# Patient Record
Sex: FEMALE | Race: WHITE | Employment: STUDENT | URBAN - METROPOLITAN AREA
[De-identification: names, ages, dates, MRNs, and addresses within clinical notes are randomized per-mention and may not be internally consistent; named-entity substitution may affect disease eponyms.]

---
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
## 2023-05-01 ENCOUNTER — LAB (OUTPATIENT)
Dept: LAB | Facility: LAB | Age: 23
End: 2023-05-01
Payer: COMMERCIAL

## 2023-05-01 ENCOUNTER — APPOINTMENT (OUTPATIENT)
Dept: PRIMARY CARE | Facility: CLINIC | Age: 23
End: 2023-05-01
Payer: COMMERCIAL

## 2023-05-01 DIAGNOSIS — Z00.00 ROUTINE GENERAL MEDICAL EXAMINATION AT A HEALTH CARE FACILITY: ICD-10-CM

## 2023-05-01 PROCEDURE — 36415 COLL VENOUS BLD VENIPUNCTURE: CPT

## 2023-05-01 PROCEDURE — 86481 TB AG RESPONSE T-CELL SUSP: CPT

## 2023-05-04 LAB
NIL(NEG) CONTROL SPOT COUNT: NORMAL
PANEL A SPOT COUNT: 0
PANEL B SPOT COUNT: 0
POS CONTROL SPOT COUNT: NORMAL
T-SPOT. TB INTERPRETATION: NEGATIVE

## 2023-05-24 PROBLEM — R12 HEARTBURN: Status: ACTIVE | Noted: 2021-05-21

## 2023-05-24 PROBLEM — F43.9 STRESS: Status: ACTIVE | Noted: 2023-05-24

## 2023-05-24 PROBLEM — N94.6 DYSMENORRHEA: Status: ACTIVE | Noted: 2021-05-21

## 2023-05-24 PROBLEM — F41.9 ANXIETY: Status: ACTIVE | Noted: 2021-05-21

## 2023-05-24 PROBLEM — N92.0 HEAVY MENSES: Status: ACTIVE | Noted: 2023-05-24

## 2023-05-24 PROBLEM — F32.A DEPRESSIVE DISORDER: Status: ACTIVE | Noted: 2021-05-21

## 2023-05-25 ENCOUNTER — OFFICE VISIT (OUTPATIENT)
Dept: PRIMARY CARE | Facility: CLINIC | Age: 23
End: 2023-05-25
Payer: COMMERCIAL

## 2023-05-25 VITALS
TEMPERATURE: 97.6 F | RESPIRATION RATE: 16 BRPM | HEART RATE: 81 BPM | WEIGHT: 136 LBS | BODY MASS INDEX: 26.7 KG/M2 | DIASTOLIC BLOOD PRESSURE: 64 MMHG | HEIGHT: 60 IN | SYSTOLIC BLOOD PRESSURE: 94 MMHG | OXYGEN SATURATION: 100 %

## 2023-05-25 DIAGNOSIS — S41.111A LACERATION OF RIGHT UPPER EXTREMITY, INITIAL ENCOUNTER: Primary | ICD-10-CM
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
## 2023-05-25 PROCEDURE — 99213 OFFICE O/P EST LOW 20 MIN: CPT | Performed by: FAMILY MEDICINE

## 2023-05-25 PROCEDURE — 1036F TOBACCO NON-USER: CPT | Performed by: FAMILY MEDICINE

## 2023-05-25 RX ORDER — CEPHALEXIN 500 MG/1
500 CAPSULE ORAL 2 TIMES DAILY
Qty: 14 CAPSULE | Refills: 0 | Status: SHIPPED | OUTPATIENT
Start: 2023-05-25 | End: 2023-06-01

## 2023-05-25 RX ORDER — CITALOPRAM 10 MG/1
10 TABLET ORAL DAILY
COMMUNITY

## 2023-05-25 ASSESSMENT — PATIENT HEALTH QUESTIONNAIRE - PHQ9
1. LITTLE INTEREST OR PLEASURE IN DOING THINGS: NOT AT ALL
2. FEELING DOWN, DEPRESSED OR HOPELESS: NOT AT ALL
SUM OF ALL RESPONSES TO PHQ9 QUESTIONS 1 AND 2: 0

## 2023-05-25 NOTE — PROGRESS NOTES
Subjective   Patient ID: Bhakti Higuera is a 22 y.o. female who presents for Cut on arm.  HPI  Pt is here for cut on the left arm possible infection x 4 days  Pt arm is very red scratch jules on her left forearm  Pt was scratch by her cat on Sunday  Pt is using Neosporin on her wound      No other concern      Review of systems  ; Patient seen today for exam denies any problems with headaches or vision, denies any shortness of breath chest pain nausea or vomiting, no black stool no blood in the stool no heartburn type symptoms denies any problems with constipation or diarrhea, and no dysuria-type symptoms    The patient's allergies medications were reviewed with them today    The patient's social family and surgical history or also reviewed here today, along with her past medical history.     Objective     Alert and active in  no acute distress    Neurological exam unremarkable- DTRs in upper and lower extremities within normal limits.   skin -no lesions noted left arm with several abrasions 1 a little deeper with some erythema surrounding the wound no signs of secondary infection at this time but some early signs of irritation      BP 94/64 (BP Location: Left arm, Patient Position: Sitting, BP Cuff Size: Adult)   Pulse 81   Temp 36.4 °C (97.6 °F) (Temporal)   Resp 16   Ht 1.524 m (5')   Wt 61.7 kg (136 lb)   SpO2 100%   BMI 26.56 kg/m²     No Known Allergies    Assessment/Plan   Problem List Items Addressed This Visit    None  Visit Diagnoses       Laceration of right upper extremity, initial encounter    -  Primary    Cat scratch        Relevant Medications    cephalexin (Keflex) 500 mg capsule        We will keep this area clean and dry we will use the Keflex for the next week we will let us know if things worsen or change tetanus is up-to-date      If anything worsens or changes please call us at once, follow up in the office as planned,

## 2023-12-18 ENCOUNTER — OFFICE VISIT (OUTPATIENT)
Dept: PRIMARY CARE | Facility: CLINIC | Age: 23
End: 2023-12-18
Payer: COMMERCIAL

## 2023-12-18 VITALS
WEIGHT: 139 LBS | DIASTOLIC BLOOD PRESSURE: 62 MMHG | OXYGEN SATURATION: 100 % | SYSTOLIC BLOOD PRESSURE: 104 MMHG | BODY MASS INDEX: 27.29 KG/M2 | HEART RATE: 79 BPM | RESPIRATION RATE: 16 BRPM | HEIGHT: 60 IN | TEMPERATURE: 98.5 F

## 2023-12-18 DIAGNOSIS — R21 RASH: ICD-10-CM

## 2023-12-18 DIAGNOSIS — B07.9 VIRAL WARTS, UNSPECIFIED TYPE: ICD-10-CM

## 2023-12-18 PROCEDURE — 3008F BODY MASS INDEX DOCD: CPT | Performed by: FAMILY MEDICINE

## 2023-12-18 PROCEDURE — 99213 OFFICE O/P EST LOW 20 MIN: CPT | Performed by: FAMILY MEDICINE

## 2023-12-18 PROCEDURE — 1036F TOBACCO NON-USER: CPT | Performed by: FAMILY MEDICINE

## 2023-12-18 RX ORDER — RELUGOLIX, ESTRADIOL HEMIHYDRATE, AND NORETHINDRONE ACETATE 40; 1; .5 MG/1; MG/1; MG/1
1 TABLET, FILM COATED ORAL
COMMUNITY
Start: 2023-06-30

## 2023-12-18 NOTE — PROGRESS NOTES
Subjective   Patient ID: Bhakti Higuera is a 23 y.o. female who presents for Rash.    HPI    Pt is being seen today for rash on Toe    Ongoing for 3 week ago  The rash or bump are located in between 2 toes   Other symptoms include itching, redness, some sensitive when wear shoe  Pt is not using or taking any OTC medication for her toes  Denies a rash anywhere else.   Shares a shower with roommate.     Flu 10/6/23     No other concern or question    Review of systems  ; Patient seen today for exam denies any problems with headaches or vision, denies any shortness of breath chest pain nausea or vomiting, no black stool no blood in the stool no heartburn type symptoms denies any problems with constipation or diarrhea, and no dysuria-type symptoms    The patient's allergies medications were reviewed with them today    The patient's social family and surgical history or also reviewed here today, along with her past medical history.     Objective     Alert and active in  no acute distress    Neurological exam unremarkable- extremities within normal limits.   skin -positive for okay on her toes second and third frozen with acute nitrogen refreeze technique x 2      /62 (BP Location: Left arm, Patient Position: Sitting, BP Cuff Size: Adult)   Pulse 79   Temp 36.9 °C (98.5 °F) (Temporal)   Resp 16   Ht 1.524 m (5')   Wt 63 kg (139 lb)   SpO2 100%   BMI 27.15 kg/m²     No Known Allergies    Assessment/Plan   Problem List Items Addressed This Visit       BMI 27.0-27.9,adult    Rash     Other Visit Diagnoses       Viral warts, unspecified type              Wart of right foot removed via freeze technique.     Recommend wearing flip flops in the shower.     If anything worsens or changes please call us at once, follow up in the office as planned.    Scribe Attestation  By signing my name below, I, Monica Wolf MA, Scribe   attest that this documentation has been prepared under the direction and in the  presence of Jose Hodges DO.

## 2024-05-23 NOTE — PROGRESS NOTES
Subjective   Patient ID: Bhakti Higuera is a 23 y.o. female who presents for No chief complaint on file..    HPI    Patient presents today for bloody stools. Ongoing x ***. Patient states ***. Has tried ***.    Taking current medications which were reviewed.  Problem list discussed.    Overall doing well.  Eating okay.  Staying active.    Has no other new problem /question.      ROS  Constitutional- No activity change. No appetite change.  Eyes- Denies vision changes.  Respiratory- No shortness of breath.  Cardiovascular- No palpitations. No chest pain.  GI- No nausea or vomiting. No diarrhea or constipation. Denies abdominal pain.  Musculoskeletal- Denies joint swelling.  Extremities- No edema.  Neurological- Denies headaches. Denies dizziness.  Skin- No rashes.  Psychiatric/Behavioral- Denies significant anxiety, or depressed mood.     Objective     There were no vitals taken for this visit.    No Known Allergies    Constitutional-- Well-nourished.  No distress  Head- unremarkable.  Ears- TMs clear.  Eyes- PERRL.  Conjunctiva normal.  Nose- Normal.  No rhinorrhea noted.  Throat- Oropharynx is clear and moist.  Neck- Supple with no thyromegaly.  No significant cervical adenopathy noted.  Pulmonary/Chest- Breath sounds normal with normal effort.  No wheezing.  Heart- Regular rate and rhythm.  No murmur.  Abdomen- Soft and non-tender.  No masses noted.  Musculoskeletal- Normal ROM.  No significant joint swelling  Extremities- No edema.   Neurological- Alert.  No noted deficits.  Skin- Warm.  No rashes.  Psychiatric/Behavioral- Mood and affect normal.  Behavior normal.     Assessment/Plan   No diagnosis found.       Long talk. Treatment options reviewed.    Continue and take your medications as prescribed.    Health Maintenance issues discussed.    Importance of healthy diet and regular exercise regimen discussed.    We will contact you with any test results ordered. If you do not hear from us, please  contact.    Follow-up as instructed or sooner if any problems or symptoms do not resolve as expected.

## 2024-05-29 ENCOUNTER — APPOINTMENT (OUTPATIENT)
Dept: PRIMARY CARE | Facility: CLINIC | Age: 24
End: 2024-05-29
Payer: COMMERCIAL

## 2024-06-10 ENCOUNTER — LAB (OUTPATIENT)
Dept: LAB | Facility: LAB | Age: 24
End: 2024-06-10
Payer: COMMERCIAL

## 2024-06-10 ENCOUNTER — OFFICE VISIT (OUTPATIENT)
Dept: PRIMARY CARE | Facility: CLINIC | Age: 24
End: 2024-06-10
Payer: COMMERCIAL

## 2024-06-10 VITALS
SYSTOLIC BLOOD PRESSURE: 120 MMHG | BODY MASS INDEX: 29.64 KG/M2 | OXYGEN SATURATION: 99 % | HEIGHT: 59 IN | RESPIRATION RATE: 18 BRPM | DIASTOLIC BLOOD PRESSURE: 60 MMHG | HEART RATE: 70 BPM | WEIGHT: 147 LBS

## 2024-06-10 DIAGNOSIS — Z01.84 IMMUNITY STATUS TESTING: ICD-10-CM

## 2024-06-10 DIAGNOSIS — Z00.00 HEALTH CARE MAINTENANCE: ICD-10-CM

## 2024-06-10 DIAGNOSIS — K92.1 BLOOD IN STOOL: ICD-10-CM

## 2024-06-10 DIAGNOSIS — Z00.00 HEALTH CARE MAINTENANCE: Primary | ICD-10-CM

## 2024-06-10 PROCEDURE — 86317 IMMUNOASSAY INFECTIOUS AGENT: CPT

## 2024-06-10 PROCEDURE — 3008F BODY MASS INDEX DOCD: CPT | Performed by: FAMILY MEDICINE

## 2024-06-10 PROCEDURE — 86787 VARICELLA-ZOSTER ANTIBODY: CPT

## 2024-06-10 PROCEDURE — 86481 TB AG RESPONSE T-CELL SUSP: CPT

## 2024-06-10 PROCEDURE — 86765 RUBEOLA ANTIBODY: CPT

## 2024-06-10 PROCEDURE — 86706 HEP B SURFACE ANTIBODY: CPT

## 2024-06-10 PROCEDURE — 36415 COLL VENOUS BLD VENIPUNCTURE: CPT

## 2024-06-10 PROCEDURE — 86735 MUMPS ANTIBODY: CPT

## 2024-06-10 PROCEDURE — 99395 PREV VISIT EST AGE 18-39: CPT | Performed by: FAMILY MEDICINE

## 2024-06-10 PROCEDURE — 1036F TOBACCO NON-USER: CPT | Performed by: FAMILY MEDICINE

## 2024-06-10 NOTE — PROGRESS NOTES
"Subjective   Patient ID: Bhakti Higuera is a 23 y.o. female who presents for Rectal Bleeding.  HPI    Patient presents in office today for bloody stool. This happened a few weeks ago. Patient admits that nothing has happened since then.  It was a small of blood in the toilet.  Once or twice on the toilet paper.  Denies anything sticking out of the rectum.  Denies any rectal pain or itching.  Denies any bloody diarrhea.    Patient admits that she will be going to PT school in the fall. Patient admits that she will need Tdap vaccine and TB testing done.  Also needs to have titers drawn.    Taking current medications which were reviewed.  Problem list discussed.    Overall doing well.  Eating okay.  Staying active.    Has no other new problem /question.     ROS  Constitutional- No activity change. No appetite change.  Eyes- Denies vision changes.  Respiratory- No shortness of breath.  Cardiovascular- No palpitations. No chest pain.  GI- No nausea or vomiting. No diarrhea or constipation. Denies abdominal pain.  Musculoskeletal- Denies joint swelling.  Extremities- No edema.  Neurological- Denies headaches. Denies dizziness.  Skin- No rashes.  Psychiatric/Behavioral- Denies significant anxiety, or depressed mood.     Objective     /60   Pulse 70   Resp 18   Ht 1.499 m (4' 11\")   Wt 66.7 kg (147 lb)   SpO2 99%   BMI 29.69 kg/m²     No Known Allergies    Constitutional-- Well-nourished.  No distress  Head- unremarkable.  Eyes- PERRL.  Conjunctiva normal.  Nose- Normal.  No rhinorrhea noted.  Throat- Oropharynx is clear and moist.  Neck- Supple with no thyromegaly.  No significant cervical adenopathy noted.  Pulmonary/Chest- Breath sounds normal with normal effort.  No wheezing.  Heart- Regular rate and rhythm.  No murmur.  Abdomen- Soft and non-tender.  No masses noted.  Musculoskeletal- Normal ROM.  No significant joint swelling  Extremities- No edema.   Neurological- Alert.  No noted deficits.  Skin- " Warm.  No rashes.  Psychiatric/Behavioral- Mood and affect normal.  Behavior normal.     Assessment/Plan   1. Health care maintenance  T-Spot TB    Hepatitis B surface antibody    Varicella zoster antibody, IgG    Measles (Rubeola) Antibody, IgG    Measles (Rubeola) Antibody, IgG    Mumps Antibody, IgG      2. Blood in stool        3. Immunity status testing  T-Spot TB    Hepatitis B surface antibody    Varicella zoster antibody, IgG    Measles (Rubeola) Antibody, IgG    Measles (Rubeola) Antibody, IgG    Mumps Antibody, IgG             Long talk. Treatment options reviewed.  Given the fact this only happened slightly once and a few times minimally she wishes to hold off any further workup and treatment and I agree.  Talked about the importance of being well-hydrated and making sure her stools stay soft    Reviewed most recent lab work with patient. Advised patient to remain up to date on routine maintenance and health screening.  Advised patient to remain up to date on immunizations.      Complete lab work as discussed.  Advised patient to remain properly hydrated.      Continue and take your medications as prescribed.    Health Maintenance issues discussed.    Importance of healthy diet and regular exercise regimen discussed.    We will contact you with any test results ordered. If you do not hear from us, please contact.    Follow-up as instructed or sooner if any problems or symptoms do not resolve as expected.          Scribe Attestation  By signing my name below, I, Jacobo Nair   attest that this documentation has been prepared under the direction and in the presence of Fredo Bustos MD.

## 2024-06-11 LAB
HBV SURFACE AB SER-ACNC: <3.1 MIU/ML
MEV IGG SER QL IA: POSITIVE
MUMPS IGG ANTIBODY INDEX: 1.9 IA
MUV IGG SER IA-ACNC: POSITIVE
RUBEOLA IGG ANTIBODY INDEX: 2.3 IA
VARICELLA ZOSTER IGG INDEX: 1 IA
VZV IGG SER QL IA: ABNORMAL

## 2024-06-12 LAB
NIL(NEG) CONTROL SPOT COUNT: NORMAL
PANEL A SPOT COUNT: 2
PANEL B SPOT COUNT: 2
POS CONTROL SPOT COUNT: NORMAL
T-SPOT. TB INTERPRETATION: NEGATIVE

## 2024-06-13 ENCOUNTER — TELEPHONE (OUTPATIENT)
Dept: PRIMARY CARE | Facility: CLINIC | Age: 24
End: 2024-06-13
Payer: COMMERCIAL

## 2024-06-13 LAB
RUBV IGG SERPL IA-ACNC: 2.3 IA
RUBV IGG SERPL QL IA: POSITIVE

## 2024-06-13 NOTE — TELEPHONE ENCOUNTER
----- Message from Fredo Bustos MD sent at 6/13/2024  9:24 AM EDT -----  Please let her know that her test results came back okay except her hepatitis B test and chickenpox varicella test came back low or equivocal.  This means she needs a hepatitis B booster and a varicella/chickenpox booster.  Please arrange for both of those.  She will need an updated vaccine copy for school along with copies of these tests.  Please let her know and arrange

## 2024-06-26 ENCOUNTER — APPOINTMENT (OUTPATIENT)
Dept: PRIMARY CARE | Facility: CLINIC | Age: 24
End: 2024-06-26
Payer: COMMERCIAL

## 2024-06-26 DIAGNOSIS — Z23 NEED FOR HEPATITIS B BOOSTER VACCINATION: Primary | ICD-10-CM

## 2024-06-26 DIAGNOSIS — Z23 NEED FOR VARICELLA VACCINE: ICD-10-CM

## 2024-06-26 PROCEDURE — 90716 VAR VACCINE LIVE SUBQ: CPT | Performed by: FAMILY MEDICINE

## 2024-06-26 PROCEDURE — 90743 HEPB VACC 2 DOSE ADOLESC IM: CPT | Performed by: FAMILY MEDICINE

## 2024-06-26 PROCEDURE — 90471 IMMUNIZATION ADMIN: CPT | Performed by: FAMILY MEDICINE

## 2024-06-26 PROCEDURE — 90472 IMMUNIZATION ADMIN EACH ADD: CPT | Performed by: FAMILY MEDICINE

## 2024-06-26 NOTE — PROGRESS NOTES
Patient given Hepatitis B vaccine LEFT deltoid.  Also given Varicella vaccine RIGHT arm.  Tolerated well.

## 2024-07-03 ENCOUNTER — TELEPHONE (OUTPATIENT)
Dept: PRIMARY CARE | Facility: CLINIC | Age: 24
End: 2024-07-03
Payer: COMMERCIAL

## 2024-07-03 NOTE — TELEPHONE ENCOUNTER
Fredo Bustos MD  Do [id number] [employer] [employer]2 hours ago (12:52 PM)       Okay for me to sign her form.  I can do this before her appointment no problem.  Please let her know and arrange

## 2024-07-03 NOTE — TELEPHONE ENCOUNTER
Patient is calling because she scheduled an appointment for a physical with you for 7/19/24 but she needs her immunization form signed for school. She wanted to know if you could sign it for her. The last physical she had with you was 7/26/2019. Please advise  Thanks      Patient's # [phone]

## 2024-07-19 ENCOUNTER — APPOINTMENT (OUTPATIENT)
Dept: PRIMARY CARE | Facility: CLINIC | Age: 24
End: 2024-07-19
Payer: COMMERCIAL

## 2024-07-24 ENCOUNTER — TELEPHONE (OUTPATIENT)
Dept: PRIMARY CARE | Facility: CLINIC | Age: 24
End: 2024-07-24

## 2024-07-24 ENCOUNTER — CLINICAL SUPPORT (OUTPATIENT)
Dept: PRIMARY CARE | Facility: CLINIC | Age: 24
End: 2024-07-24
Payer: COMMERCIAL

## 2024-07-24 DIAGNOSIS — Z23 NEED FOR HEPATITIS B BOOSTER VACCINATION: ICD-10-CM

## 2024-07-24 DIAGNOSIS — Z00.00 HEALTH CARE MAINTENANCE: ICD-10-CM

## 2024-07-24 PROCEDURE — 90471 IMMUNIZATION ADMIN: CPT | Performed by: FAMILY MEDICINE

## 2024-07-24 PROCEDURE — 90743 HEPB VACC 2 DOSE ADOLESC IM: CPT | Performed by: FAMILY MEDICINE

## 2024-07-24 NOTE — TELEPHONE ENCOUNTER
Patient came into the office today because she needs labs and immunizations done for school. Patient states that she will need a second hep B vaccine which she will receive in office today. She also needs to have a  quantiferon TB gold blood test along with another set of Varicella titers done. Patient was informed  that the only form of TB blood work that we do it the T-Spot test that she had done. She was told by the school that the t-spot is not what they want.     Please advise.

## 2024-07-24 NOTE — TELEPHONE ENCOUNTER
Patient states she needs to have quantiferon TB gold blood test and 2nd Hepatitis B vaccine and bloodwork and titers, Please advise

## 2024-07-25 NOTE — TELEPHONE ENCOUNTER
Fredo Bustos MD   to Do [id number] [employer]       7/24/24  5:31 PM  Looks like the patient came in for her hepatitis booster.  She was also supposed to get the varicella vaccine booster.  If she did not get it she needs to get it.  Please let her know she has to wait at least a month after getting the chickenpox varicella booster before we do another titer.  She should find out whether her school will except the fact that she is getting the vaccine as opposed to getting the titer later.  As far as the T spot test goes I believe this is the same as the QuantiFERON gold test.  Please clarify this with [facility] lab.  Please let the patient know that this is the only test that all of [facility] in the [facility] does for TB.  I cannot understand why her school will not except it.  If not we can send her to the [facility] or another lab if they do the one she is talking about.  Please let her know and arrange

## 2024-07-25 NOTE — TELEPHONE ENCOUNTER
7/24/24  5:31 PM  Looks like the patient came in for her hepatitis booster.  She was also supposed to get the varicella vaccine booster.  If she did not get it she needs to get it.  Please let her know she has to wait at least a month after getting the chickenpox varicella booster before we do another titer.  She should find out whether her school will except the fact that she is getting the vaccine as opposed to getting the titer later.  As far as the T spot test goes I believe this is the same as the QuantiFERON gold test.  Please clarify this with [facility] lab.  Please let the patient know that this is the only test that all of [facility] in the [facility] does for TB.  I cannot understand why her school will not except it.  If not we can send her to the [facility] or another lab if they do the one she is talking about.  Please let her know and arrange        Spoke with patient.  She will get Hep B titer and QuantiFERON at school.  Varicella titer ordered as she had this vaccine in June.

## 2024-07-26 ENCOUNTER — LAB (OUTPATIENT)
Dept: LAB | Facility: LAB | Age: 24
End: 2024-07-26
Payer: COMMERCIAL

## 2024-07-26 DIAGNOSIS — Z00.00 HEALTH CARE MAINTENANCE: ICD-10-CM

## 2024-07-26 PROCEDURE — 36415 COLL VENOUS BLD VENIPUNCTURE: CPT

## 2024-07-26 PROCEDURE — 86787 VARICELLA-ZOSTER ANTIBODY: CPT

## 2024-07-27 LAB
VARICELLA ZOSTER IGG INDEX: 5.8 IA
VZV IGG SER QL IA: POSITIVE

## 2024-08-02 ENCOUNTER — TELEPHONE (OUTPATIENT)
Dept: PRIMARY CARE | Facility: CLINIC | Age: 24
End: 2024-08-02
Payer: COMMERCIAL

## 2024-08-02 NOTE — TELEPHONE ENCOUNTER
----- Message from Jose Hodges sent at 8/2/2024 12:44 PM EDT -----  Good immunity now for chickenpox no further shots needed